# Patient Record
Sex: MALE | Race: WHITE | NOT HISPANIC OR LATINO | Employment: FULL TIME | URBAN - METROPOLITAN AREA
[De-identification: names, ages, dates, MRNs, and addresses within clinical notes are randomized per-mention and may not be internally consistent; named-entity substitution may affect disease eponyms.]

---

## 2017-10-02 ENCOUNTER — OFFICE VISIT (OUTPATIENT)
Dept: PRIMARY CARE CLINIC | Facility: CLINIC | Age: 22
End: 2017-10-02
Payer: COMMERCIAL

## 2017-10-02 ENCOUNTER — TELEPHONE (OUTPATIENT)
Dept: PRIMARY CARE CLINIC | Facility: CLINIC | Age: 22
End: 2017-10-02

## 2017-10-02 DIAGNOSIS — J06.9 VIRAL URI: ICD-10-CM

## 2017-10-02 DIAGNOSIS — H60.00 FURUNCLE OF EAR: Primary | ICD-10-CM

## 2017-10-02 PROCEDURE — 99999 PR PBB SHADOW E&M-EST. PATIENT-LVL II: CPT | Mod: PBBFAC,,, | Performed by: NURSE PRACTITIONER

## 2017-10-02 PROCEDURE — 99214 OFFICE O/P EST MOD 30 MIN: CPT | Mod: 25,S$GLB,, | Performed by: NURSE PRACTITIONER

## 2017-10-02 PROCEDURE — 10060 I&D ABSCESS SIMPLE/SINGLE: CPT | Mod: S$GLB,,, | Performed by: NURSE PRACTITIONER

## 2017-10-02 RX ORDER — SULFAMETHOXAZOLE AND TRIMETHOPRIM 800; 160 MG/1; MG/1
1 TABLET ORAL 2 TIMES DAILY
Qty: 14 TABLET | Refills: 0 | Status: SHIPPED | OUTPATIENT
Start: 2017-10-02 | End: 2017-10-09

## 2017-10-02 NOTE — TELEPHONE ENCOUNTER
----- Message from Ara Meza sent at 10/2/2017  9:20 AM CDT -----  Contact: Mother  Georgiana, mother [phone], Calling for same day appointment, bumps on back of throat and abscess behind right ear. Please advise. Thanks.

## 2017-10-02 NOTE — PROGRESS NOTES
"Chief Complaint  Chief Complaint   Patient presents with    Bump behind ear       HPI  Cesario Adrian is a 21 y.o. male with medical diagnoses as listed within the medical history and problem list that presents for sore throat, runny nose X 3 days.  Patient is new to me but known to the clinic per his last appointment. Presents with sore throat, runny nose for the past three days. Denies fever, cough, congestion. Also presents with "bump" behind right ear that appearing following popping of a blackhead. Popped approximately 3 days ago and has progressively gotten bigger. Tender to touch. Not currently taking any medications or treating with any ointments. Denies cp, sob, palpitations.         PAST MEDICAL HISTORY:  History reviewed. No pertinent past medical history.    PAST SURGICAL HISTORY:  History reviewed. No pertinent surgical history.    SOCIAL HISTORY:  Social History     Social History    Marital status: Single     Spouse name: N/A    Number of children: N/A    Years of education: N/A     Occupational History    Not on file.     Social History Main Topics    Smoking status: Never Smoker    Smokeless tobacco: Never Used    Alcohol use No    Drug use: No    Sexual activity: Yes     Other Topics Concern    Not on file     Social History Narrative    No narrative on file       FAMILY HISTORY:  History reviewed. No pertinent family history.    ALLERGIES AND MEDICATIONS: updated and reviewed.  Review of patient's allergies indicates:  No Known Allergies  Current Outpatient Prescriptions   Medication Sig Dispense Refill    sulfamethoxazole-trimethoprim 800-160mg (BACTRIM DS) 800-160 mg Tab Take 1 tablet by mouth 2 (two) times daily. 14 tablet 0     No current facility-administered medications for this visit.          ROS  Review of Systems   Constitutional: Negative for chills, fatigue and fever.   HENT: Negative for congestion, rhinorrhea, sinus pressure and sore throat.    Respiratory: Negative for " cough, chest tightness and shortness of breath.    Cardiovascular: Negative for chest pain and palpitations.   Gastrointestinal: Negative for abdominal pain, blood in stool, diarrhea, nausea and vomiting.   Genitourinary: Negative for dysuria, frequency, hematuria and urgency.   Musculoskeletal: Negative for arthralgias and joint swelling.   Skin: Negative for rash and wound.   Neurological: Negative for dizziness and headaches.   Psychiatric/Behavioral: Negative for dysphoric mood and sleep disturbance. The patient is not nervous/anxious.            PHYSICAL EXAM  There were no vitals filed for this visit. There is no height or weight on file to calculate BMI.              Physical Exam   Constitutional: He is oriented to person, place, and time. He appears well-developed and well-nourished.   HENT:   Head: Normocephalic.   Right Ear: Tympanic membrane normal.   Left Ear: Tympanic membrane normal.   Mouth/Throat: Uvula is midline and mucous membranes are normal. Posterior oropharyngeal erythema present. Tonsils are 1+ on the right. Tonsils are 1+ on the left. No tonsillar exudate.   Eyes: Conjunctivae are normal.   Cardiovascular: Normal rate, regular rhythm, normal heart sounds and normal pulses.    No murmur heard.  Pulses:       Radial pulses are 2+ on the right side, and 2+ on the left side.   Pulmonary/Chest: Effort normal and breath sounds normal. He has no wheezes.   Abdominal: Soft. Bowel sounds are normal. There is no tenderness.   Musculoskeletal: He exhibits no edema.   Lymphadenopathy:     He has no cervical adenopathy.   Neurological: He is alert and oriented to person, place, and time.   Skin: Skin is warm and dry. No rash noted.   Psychiatric: He has a normal mood and affect.         Procedure: Cesario was seen in the clinic room and placed in the lateral lying position on the treatment table. Attention was directed to the abscess which was located behind the right ear, where an wound measuring 1 cm cm  is noted. The area was prepped with betadine. Approximately 2cc of 2% lidocaine without epi was injected into the rodo-abscess area. Once the area was anesthetized allowing 2-3 minutes for the anesthetic to take effect, I utilized a #11 scalpel to cut through the skin into the abscess area. I allowed the pus to drain utilizing gauze to absorb drainage and blood. The abscess cavity was explored breaking up any loculations within the abscess cavity. The wound was then covered by placing a gauze dressing over the wound and securing with paper tape. The patient tolerated the procedure well.      Health Maintenance       Date Due Completion Date    Lipid Panel 1994 ---    TETANUS VACCINE 11/04/2012 ---    Influenza Vaccine 08/01/2017 ---            Assessment & Plan    Cesario was seen today for bump behind ear    Diagnoses and all orders for this visit:    Furuncle of ear  -     sulfamethoxazole-trimethoprim 800-160mg (BACTRIM DS) 800-160 mg Tab; Take 1 tablet by mouth 2 (two) times daily.  - Warm compresses 20 minutes twice daily.       Viral URI       -  Instructed to call if symptoms persist for 5 more day, for worsening of symptoms, and for the occurrence of fever.           Health Maintenance reviewed.    Follow-up: Return if symptoms worsen or fail to improve.

## 2018-06-25 ENCOUNTER — OFFICE VISIT (OUTPATIENT)
Dept: PRIMARY CARE CLINIC | Facility: CLINIC | Age: 23
End: 2018-06-25
Payer: COMMERCIAL

## 2018-06-25 VITALS
BODY MASS INDEX: 23.48 KG/M2 | HEIGHT: 70 IN | RESPIRATION RATE: 18 BRPM | TEMPERATURE: 99 F | SYSTOLIC BLOOD PRESSURE: 113 MMHG | HEART RATE: 73 BPM | DIASTOLIC BLOOD PRESSURE: 68 MMHG | OXYGEN SATURATION: 97 % | WEIGHT: 164 LBS

## 2018-06-25 DIAGNOSIS — J02.9 ACUTE PHARYNGITIS, UNSPECIFIED ETIOLOGY: Primary | ICD-10-CM

## 2018-06-25 PROCEDURE — 99213 OFFICE O/P EST LOW 20 MIN: CPT | Mod: S$GLB,,, | Performed by: FAMILY MEDICINE

## 2018-06-25 PROCEDURE — 99999 PR PBB SHADOW E&M-EST. PATIENT-LVL III: CPT | Mod: PBBFAC,,, | Performed by: FAMILY MEDICINE

## 2018-06-25 PROCEDURE — 3008F BODY MASS INDEX DOCD: CPT | Mod: CPTII,S$GLB,, | Performed by: FAMILY MEDICINE

## 2018-06-25 RX ORDER — AMOXICILLIN 500 MG/1
500 CAPSULE ORAL 3 TIMES DAILY
Qty: 30 CAPSULE | Refills: 0 | Status: SHIPPED | OUTPATIENT
Start: 2018-06-25 | End: 2018-07-05

## 2018-06-25 NOTE — PROGRESS NOTES
"Subjective:       Patient ID: Cesario Adrian is a 22 y.o. male.    Chief Complaint: Sore Throat (patient says his throat is sore and has "bumps" on his tongue )    Sore Throat    This is a new problem. The current episode started in the past 7 days. The problem has been gradually worsening. Neither side of throat is experiencing more pain than the other. There has been no fever. The pain is moderate. Associated symptoms include swollen glands. Pertinent negatives include no abdominal pain, coughing, diarrhea, headaches, hoarse voice, shortness of breath or vomiting. He has had no exposure to strep or mono. He has tried gargles for the symptoms. The treatment provided no relief.     Review of Systems   Constitutional: Negative for fever.   HENT: Positive for sore throat. Negative for hoarse voice.    Respiratory: Negative for cough and shortness of breath.    Gastrointestinal: Negative for abdominal pain, diarrhea and vomiting.   Skin: Negative for rash.   Neurological: Negative for headaches.       Objective:      Vitals:    06/25/18 1604   BP: 113/68   BP Location: Left arm   Patient Position: Sitting   BP Method: Medium (Automatic)   Pulse: 73   Resp: 18   Temp: 98.6 °F (37 °C)   TempSrc: Oral   SpO2: 97%   Weight: 74.4 kg (164 lb)   Height: 5' 10" (1.778 m)     Physical Exam   Constitutional: He is oriented to person, place, and time. He appears well-developed and well-nourished.   HENT:   Head: Normocephalic and atraumatic.   Mouth/Throat: Posterior oropharyngeal erythema present. No oropharyngeal exudate.   Cardiovascular: Normal rate, regular rhythm and normal heart sounds.    Pulmonary/Chest: Effort normal and breath sounds normal.   Musculoskeletal: He exhibits no edema.   Lymphadenopathy:     He has cervical adenopathy.   Neurological: He is alert and oriented to person, place, and time.   Skin: Skin is warm and dry.   Nursing note and vitals reviewed.      Assessment:       1. Acute pharyngitis, " unspecified etiology        Plan:       Acute pharyngitis, unspecified etiology  -     amoxicillin (AMOXIL) 500 MG capsule; Take 1 capsule (500 mg total) by mouth 3 (three) times daily. for 10 days  Dispense: 30 capsule; Refill: 0

## 2018-07-17 ENCOUNTER — OFFICE VISIT (OUTPATIENT)
Dept: PRIMARY CARE CLINIC | Facility: CLINIC | Age: 23
End: 2018-07-17
Payer: COMMERCIAL

## 2018-07-17 VITALS
HEART RATE: 48 BPM | BODY MASS INDEX: 20.12 KG/M2 | SYSTOLIC BLOOD PRESSURE: 102 MMHG | DIASTOLIC BLOOD PRESSURE: 61 MMHG | RESPIRATION RATE: 18 BRPM | OXYGEN SATURATION: 100 % | HEIGHT: 71 IN | WEIGHT: 143.69 LBS | TEMPERATURE: 98 F

## 2018-07-17 DIAGNOSIS — K14.0 GLOSSITIS: Primary | ICD-10-CM

## 2018-07-17 DIAGNOSIS — J02.9 PHARYNGITIS, UNSPECIFIED ETIOLOGY: ICD-10-CM

## 2018-07-17 PROCEDURE — 99213 OFFICE O/P EST LOW 20 MIN: CPT | Mod: 25,S$GLB,, | Performed by: FAMILY MEDICINE

## 2018-07-17 PROCEDURE — 99999 PR PBB SHADOW E&M-EST. PATIENT-LVL IV: CPT | Mod: PBBFAC,,, | Performed by: FAMILY MEDICINE

## 2018-07-17 PROCEDURE — 96372 THER/PROPH/DIAG INJ SC/IM: CPT | Mod: S$GLB,,, | Performed by: FAMILY MEDICINE

## 2018-07-17 PROCEDURE — 3008F BODY MASS INDEX DOCD: CPT | Mod: CPTII,S$GLB,, | Performed by: FAMILY MEDICINE

## 2018-07-17 RX ORDER — AZITHROMYCIN 250 MG/1
TABLET, FILM COATED ORAL
Qty: 6 TABLET | Refills: 0 | Status: SHIPPED | OUTPATIENT
Start: 2018-07-17 | End: 2018-07-21

## 2018-07-17 RX ORDER — BETAMETHASONE SODIUM PHOSPHATE AND BETAMETHASONE ACETATE 3; 3 MG/ML; MG/ML
12 INJECTION, SUSPENSION INTRA-ARTICULAR; INTRALESIONAL; INTRAMUSCULAR; SOFT TISSUE
Status: COMPLETED | OUTPATIENT
Start: 2018-07-17 | End: 2018-07-17

## 2018-07-17 RX ORDER — NYSTATIN 100000 [USP'U]/ML
5 SUSPENSION ORAL 4 TIMES DAILY
Qty: 200 ML | Refills: 0 | Status: SHIPPED | OUTPATIENT
Start: 2018-07-17 | End: 2018-07-27

## 2018-07-17 RX ORDER — METHYLPREDNISOLONE 4 MG/1
TABLET ORAL
Qty: 1 PACKAGE | Refills: 0 | Status: SHIPPED | OUTPATIENT
Start: 2018-07-17 | End: 2018-08-07

## 2018-07-17 RX ADMIN — BETAMETHASONE SODIUM PHOSPHATE AND BETAMETHASONE ACETATE 12 MG: 3; 3 INJECTION, SUSPENSION INTRA-ARTICULAR; INTRALESIONAL; INTRAMUSCULAR; SOFT TISSUE at 03:07

## 2018-07-17 NOTE — PROGRESS NOTES
Subjective:       Patient ID: Cesario Adrian is a 22 y.o. male.    Chief Complaint: Follow-up (still has bumps in back of his throat and now has them on his tongue)    HPI:  22-year-old white male in for followup sore throat seen by Dr. Worthy 06/25/2018 felt to have pharyngitis treated with amoxicillin.  Was hard to swallow went away for a little while but hard to swallow again.  No fever, no runny nose, +sore throat---patient has same bumps in his throat and on his tongue not sure of taste buds are not, slight cough--was treated with amoxicillin.  No pneumonia asthma TB.  Social smoker-cigar    ROS:  Skin: no psoriasis, eczema, skin cancer  HEENT: No headache, ocular pain, blurred vision, diplopia, epistaxis, hoarseness change in voice, thyroid trouble  Lung: No pneumonia, asthma, Tb, wheezing, SOB, +smokes occasional cigar  Heart: No chest pain, ankle edema, palpitations, MI, spohia murmur, hypertension, hyperlipidemia  Abdomen: No nausea, vomiting, diarrhea, constipation, ulcers, hepatitis, gallbladder disease, melena, hematochezia, hematemesis  : no UTI, renal disease, stones  MS: no fractures, O/A, lupus, rheumatoid, gout  Neuro: No dizziness, LOC, seizures   No diabetes, no anemia, no anxiety, no depression   Single, no children, 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drywalls, 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    Objective:   Physical Exam:  General: Well nourished, well developed, no acute distress +thin  Skin: No lesions  HEENT: Eyes PERRLA, EOM intact, nose patent, throat--swollen taste buds in the back of the time--erythematous with some erythema on the side of the left toe--had some discharge in the past from the left lower wisdom tooth--appears okay male but the taste buds in the left side of the tongue does show some areas of erythema and swelling, slight white coat to the back of the time  NECK: Supple, no bruits, No JVD, no nodes  Lungs: Clear, no rales, rhonchi, wheezing  Heart: Regular rate and rhythm, no murmurs, gallops, or  rubs  Abdomen: flat, bowel sounds positive, no tenderness, or organomegaly  MS: Range of motion and muscle strength intact  Neuro: Alert, CN intact, oriented X 3  Extremities: No cyanosis, clubbing, or edema         Assessment:       1. Glossitis    2. Pharyngitis, unspecified etiology        Plan:       Glossitis    Pharyngitis, unspecified etiology      Celeston/eZithromax/Medrol/nystatin oral suspension--if lung zone tongue irritation decide the time persist see ENT  Get appointment with dentist to evaluate left with some to lower jaw

## 2018-12-18 ENCOUNTER — OFFICE VISIT (OUTPATIENT)
Dept: PRIMARY CARE CLINIC | Facility: CLINIC | Age: 23
End: 2018-12-18
Payer: COMMERCIAL

## 2018-12-18 VITALS
HEART RATE: 50 BPM | DIASTOLIC BLOOD PRESSURE: 62 MMHG | SYSTOLIC BLOOD PRESSURE: 110 MMHG | WEIGHT: 157 LBS | HEIGHT: 71 IN | RESPIRATION RATE: 18 BRPM | BODY MASS INDEX: 21.98 KG/M2 | OXYGEN SATURATION: 99 %

## 2018-12-18 DIAGNOSIS — L02.91 CUTANEOUS ABSCESS, UNSPECIFIED SITE: Primary | ICD-10-CM

## 2018-12-18 PROCEDURE — 99999 PR PBB SHADOW E&M-EST. PATIENT-LVL III: CPT | Mod: PBBFAC,,, | Performed by: FAMILY MEDICINE

## 2018-12-18 PROCEDURE — 99213 OFFICE O/P EST LOW 20 MIN: CPT | Mod: 25,S$GLB,, | Performed by: FAMILY MEDICINE

## 2018-12-18 PROCEDURE — 69000 DRG XTRNL EAR ABSC/HEM SMPL: CPT | Mod: LT,S$GLB,, | Performed by: FAMILY MEDICINE

## 2018-12-18 PROCEDURE — 3008F BODY MASS INDEX DOCD: CPT | Mod: CPTII,S$GLB,, | Performed by: FAMILY MEDICINE

## 2018-12-18 RX ORDER — HYDROCODONE BITARTRATE AND ACETAMINOPHEN 5; 325 MG/1; MG/1
1 TABLET ORAL EVERY 6 HOURS PRN
Qty: 30 TABLET | Refills: 0 | Status: SHIPPED | OUTPATIENT
Start: 2018-12-18 | End: 2020-07-14 | Stop reason: CLARIF

## 2018-12-18 RX ORDER — CEPHALEXIN 500 MG/1
500 CAPSULE ORAL EVERY 8 HOURS
Qty: 30 CAPSULE | Refills: 0 | Status: SHIPPED | OUTPATIENT
Start: 2018-12-18 | End: 2020-07-14 | Stop reason: CLARIF

## 2018-12-18 NOTE — PROGRESS NOTES
Subjective:       Patient ID: Cesario Adrian is a 23 y.o. male.    Chief Complaint: Abscess    HPI:  24 yo WM--patient with abscess behind left ear-the did have one behind  the opposite ear in the past.  Was at least 1 year ago.  Patient feels like the lesion has gotten emptier  was more tense.    ROS:  Skin: no psoriasis, eczema, skin cancer see history of present  HEENT: No headache, ocular pain, blurred vision, diplopia, epistaxis, hoarseness change in voice, thyroid trouble  Lung: No pneumonia, asthma, Tb, wheezing, SOB, +smokes occasional cigar  Heart: No chest pain, ankle edema, palpitations, MI, sophia murmur, hypertension, hyperlipidemia  Abdomen: No nausea, vomiting, diarrhea, constipation, ulcers, hepatitis, gallbladder disease, melena, hematochezia, hematemesis  : no UTI, renal disease, stones  MS: no fractures, O/A, lupus, rheumatoid, gout  Neuro: No dizziness, LOC, seizures   No diabetes, no anemia, no anxiety, no depression   Single, no children, 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[employer], 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    Objective:   Physical Exam:  General: Well nourished, well developed, no acute distress +thin  Skin:  Cyst behind left auricle approximately 1 cm diameter raise--soft  HEENT: Eyes PERRLA, EOM intact, nose patent, throat-non erythematous ears TMs clear  NECK: Supple, no bruits, No JVD, no nodes  Lungs: Clear, no rales, rhonchi, wheezing  Heart: Regular rate and rhythm, no murmurs, gallops, or rubs  Abdomen: flat, bowel sounds positive, no tenderness, or organomegaly  MS: Range of motion and muscle strength intact  Neuro: Alert, CN intact, oriented X 3  Extremities: No cyanosis, clubbing, or edema         Assessment:       1. Cutaneous abscess, unspecified site        Plan:       Cutaneous abscess, unspecified site      lesion 10 was xylocaine cut with a 11.  Blade --pus obtained  Keflex 500 tid, neosporin BID, Norco 1/2 po q 6 hrs prn pain  If recurs to derm for removal

## 2018-12-18 NOTE — PROCEDURES
Incision & Drainage  Date/Time: 12/18/2018 12:12 PM  Performed by: Shukri Yanez MD  Authorized by: Shukri Yanez MD     Consent Done?:  Not Needed    Type:  Abscess  Body area:  Head/neck  Location details:  Left external ear  Anesthesia:  Local infiltration  Local anesthetic: lidocaine 1% without epinephrine  Scalpel size:  11  Incision type:  Single straight  Complexity:  Simple  Drainage:  Pus and serosanguinous  Drainage amount:  Moderate  Wound treatment:  Incision and wound left open  Patient tolerance:  Patient tolerated the procedure well with no immediate complications

## 2020-10-05 ENCOUNTER — PATIENT MESSAGE (OUTPATIENT)
Dept: ADMINISTRATIVE | Facility: HOSPITAL | Age: 25
End: 2020-10-05

## 2021-01-04 ENCOUNTER — PATIENT MESSAGE (OUTPATIENT)
Dept: ADMINISTRATIVE | Facility: HOSPITAL | Age: 26
End: 2021-01-04

## 2021-04-05 ENCOUNTER — PATIENT MESSAGE (OUTPATIENT)
Dept: ADMINISTRATIVE | Facility: HOSPITAL | Age: 26
End: 2021-04-05

## 2021-07-01 ENCOUNTER — PATIENT MESSAGE (OUTPATIENT)
Dept: ADMINISTRATIVE | Facility: OTHER | Age: 26
End: 2021-07-01

## 2022-07-17 PROBLEM — A41.89 SEPSIS, VIRAL: Status: ACTIVE | Noted: 2022-07-17

## 2022-07-17 PROBLEM — U07.1 COVID-19: Status: ACTIVE | Noted: 2022-07-17

## 2022-07-17 PROBLEM — N17.9 AKI (ACUTE KIDNEY INJURY): Status: ACTIVE | Noted: 2022-07-17

## 2022-07-17 PROBLEM — E86.9 VOLUME DEPLETION: Status: ACTIVE | Noted: 2022-07-17

## 2022-07-17 PROBLEM — B97.89 SEPSIS, VIRAL: Status: ACTIVE | Noted: 2022-07-17

## 2022-07-19 PROBLEM — R00.1 BRADYCARDIA: Status: ACTIVE | Noted: 2022-07-19

## 2022-07-20 PROBLEM — E86.9 VOLUME DEPLETION: Status: RESOLVED | Noted: 2022-07-17 | Resolved: 2022-07-20

## 2022-07-20 PROBLEM — N17.9 ARF (ACUTE RENAL FAILURE): Status: RESOLVED | Noted: 2022-07-17 | Resolved: 2022-07-20

## 2023-02-17 PROBLEM — D72.829 LEUKOCYTOSIS: Status: ACTIVE | Noted: 2023-02-17

## 2023-02-17 PROBLEM — E83.52 HYPERCALCEMIA: Status: ACTIVE | Noted: 2023-02-17

## 2023-02-17 PROBLEM — F12.188 CANNABIS HYPEREMESIS SYNDROME CONCURRENT WITH AND DUE TO CANNABIS ABUSE: Status: ACTIVE | Noted: 2023-02-17

## 2023-02-17 PROBLEM — E87.6 HYPOKALEMIA: Status: ACTIVE | Noted: 2023-02-17

## 2023-02-18 PROBLEM — E87.6 HYPOKALEMIA: Status: RESOLVED | Noted: 2023-02-17 | Resolved: 2023-02-18

## 2023-02-18 PROBLEM — N17.9 AKI (ACUTE KIDNEY INJURY): Status: RESOLVED | Noted: 2022-07-17 | Resolved: 2023-02-18

## 2023-02-18 PROBLEM — E83.52 HYPERCALCEMIA: Status: RESOLVED | Noted: 2023-02-17 | Resolved: 2023-02-18

## 2023-07-18 ENCOUNTER — OFFICE VISIT (OUTPATIENT)
Dept: PRIMARY CARE CLINIC | Facility: CLINIC | Age: 28
End: 2023-07-18
Payer: COMMERCIAL

## 2023-07-18 VITALS
DIASTOLIC BLOOD PRESSURE: 68 MMHG | RESPIRATION RATE: 18 BRPM | SYSTOLIC BLOOD PRESSURE: 110 MMHG | HEART RATE: 75 BPM | BODY MASS INDEX: 22.41 KG/M2 | OXYGEN SATURATION: 98 % | TEMPERATURE: 98 F | HEIGHT: 70 IN | WEIGHT: 156.5 LBS

## 2023-07-18 DIAGNOSIS — B35.3 TINEA PEDIS OF LEFT FOOT: ICD-10-CM

## 2023-07-18 DIAGNOSIS — A60.00 GENITAL HERPES SIMPLEX, UNSPECIFIED SITE: ICD-10-CM

## 2023-07-18 DIAGNOSIS — Z23 NEED FOR VACCINATION: ICD-10-CM

## 2023-07-18 DIAGNOSIS — E80.6 HYPERBILIRUBINEMIA: ICD-10-CM

## 2023-07-18 DIAGNOSIS — Z76.89 ENCOUNTER TO ESTABLISH CARE: Primary | ICD-10-CM

## 2023-07-18 DIAGNOSIS — Z13.6 ENCOUNTER FOR SCREENING FOR CARDIOVASCULAR DISORDERS: ICD-10-CM

## 2023-07-18 PROCEDURE — 90471 IMMUNIZATION ADMIN: CPT | Mod: S$GLB,,, | Performed by: STUDENT IN AN ORGANIZED HEALTH CARE EDUCATION/TRAINING PROGRAM

## 2023-07-18 PROCEDURE — 90715 TDAP VACCINE GREATER THAN OR EQUAL TO 7YO IM: ICD-10-PCS | Mod: S$GLB,,, | Performed by: STUDENT IN AN ORGANIZED HEALTH CARE EDUCATION/TRAINING PROGRAM

## 2023-07-18 PROCEDURE — 3008F PR BODY MASS INDEX (BMI) DOCUMENTED: ICD-10-PCS | Mod: CPTII,S$GLB,, | Performed by: STUDENT IN AN ORGANIZED HEALTH CARE EDUCATION/TRAINING PROGRAM

## 2023-07-18 PROCEDURE — 1159F PR MEDICATION LIST DOCUMENTED IN MEDICAL RECORD: ICD-10-PCS | Mod: CPTII,S$GLB,, | Performed by: STUDENT IN AN ORGANIZED HEALTH CARE EDUCATION/TRAINING PROGRAM

## 2023-07-18 PROCEDURE — 99204 OFFICE O/P NEW MOD 45 MIN: CPT | Mod: 25,S$GLB,, | Performed by: STUDENT IN AN ORGANIZED HEALTH CARE EDUCATION/TRAINING PROGRAM

## 2023-07-18 PROCEDURE — 99999 PR PBB SHADOW E&M-EST. PATIENT-LVL IV: ICD-10-PCS | Mod: PBBFAC,,, | Performed by: STUDENT IN AN ORGANIZED HEALTH CARE EDUCATION/TRAINING PROGRAM

## 2023-07-18 PROCEDURE — 90472 TDAP VACCINE GREATER THAN OR EQUAL TO 7YO IM: ICD-10-PCS | Mod: S$GLB,,, | Performed by: STUDENT IN AN ORGANIZED HEALTH CARE EDUCATION/TRAINING PROGRAM

## 2023-07-18 PROCEDURE — 3074F SYST BP LT 130 MM HG: CPT | Mod: CPTII,S$GLB,, | Performed by: STUDENT IN AN ORGANIZED HEALTH CARE EDUCATION/TRAINING PROGRAM

## 2023-07-18 PROCEDURE — 90651 9VHPV VACCINE 2/3 DOSE IM: CPT | Mod: S$GLB,,, | Performed by: STUDENT IN AN ORGANIZED HEALTH CARE EDUCATION/TRAINING PROGRAM

## 2023-07-18 PROCEDURE — 1160F RVW MEDS BY RX/DR IN RCRD: CPT | Mod: CPTII,S$GLB,, | Performed by: STUDENT IN AN ORGANIZED HEALTH CARE EDUCATION/TRAINING PROGRAM

## 2023-07-18 PROCEDURE — 90471 HPV VACCINE 9-VALENT 3 DOSE IM: ICD-10-PCS | Mod: S$GLB,,, | Performed by: STUDENT IN AN ORGANIZED HEALTH CARE EDUCATION/TRAINING PROGRAM

## 2023-07-18 PROCEDURE — 1160F PR REVIEW ALL MEDS BY PRESCRIBER/CLIN PHARMACIST DOCUMENTED: ICD-10-PCS | Mod: CPTII,S$GLB,, | Performed by: STUDENT IN AN ORGANIZED HEALTH CARE EDUCATION/TRAINING PROGRAM

## 2023-07-18 PROCEDURE — 99204 PR OFFICE/OUTPT VISIT, NEW, LEVL IV, 45-59 MIN: ICD-10-PCS | Mod: 25,S$GLB,, | Performed by: STUDENT IN AN ORGANIZED HEALTH CARE EDUCATION/TRAINING PROGRAM

## 2023-07-18 PROCEDURE — 90715 TDAP VACCINE 7 YRS/> IM: CPT | Mod: S$GLB,,, | Performed by: STUDENT IN AN ORGANIZED HEALTH CARE EDUCATION/TRAINING PROGRAM

## 2023-07-18 PROCEDURE — 3008F BODY MASS INDEX DOCD: CPT | Mod: CPTII,S$GLB,, | Performed by: STUDENT IN AN ORGANIZED HEALTH CARE EDUCATION/TRAINING PROGRAM

## 2023-07-18 PROCEDURE — 1159F MED LIST DOCD IN RCRD: CPT | Mod: CPTII,S$GLB,, | Performed by: STUDENT IN AN ORGANIZED HEALTH CARE EDUCATION/TRAINING PROGRAM

## 2023-07-18 PROCEDURE — 90651 HPV VACCINE 9-VALENT 3 DOSE IM: ICD-10-PCS | Mod: S$GLB,,, | Performed by: STUDENT IN AN ORGANIZED HEALTH CARE EDUCATION/TRAINING PROGRAM

## 2023-07-18 PROCEDURE — 3078F DIAST BP <80 MM HG: CPT | Mod: CPTII,S$GLB,, | Performed by: STUDENT IN AN ORGANIZED HEALTH CARE EDUCATION/TRAINING PROGRAM

## 2023-07-18 PROCEDURE — 99999 PR PBB SHADOW E&M-EST. PATIENT-LVL IV: CPT | Mod: PBBFAC,,, | Performed by: STUDENT IN AN ORGANIZED HEALTH CARE EDUCATION/TRAINING PROGRAM

## 2023-07-18 PROCEDURE — 3078F PR MOST RECENT DIASTOLIC BLOOD PRESSURE < 80 MM HG: ICD-10-PCS | Mod: CPTII,S$GLB,, | Performed by: STUDENT IN AN ORGANIZED HEALTH CARE EDUCATION/TRAINING PROGRAM

## 2023-07-18 PROCEDURE — 90472 IMMUNIZATION ADMIN EACH ADD: CPT | Mod: S$GLB,,, | Performed by: STUDENT IN AN ORGANIZED HEALTH CARE EDUCATION/TRAINING PROGRAM

## 2023-07-18 PROCEDURE — 3074F PR MOST RECENT SYSTOLIC BLOOD PRESSURE < 130 MM HG: ICD-10-PCS | Mod: CPTII,S$GLB,, | Performed by: STUDENT IN AN ORGANIZED HEALTH CARE EDUCATION/TRAINING PROGRAM

## 2023-07-18 RX ORDER — CLOTRIMAZOLE 1 %
CREAM (GRAM) TOPICAL 2 TIMES DAILY
Qty: 60 G | Refills: 1 | Status: SHIPPED | OUTPATIENT
Start: 2023-07-18

## 2023-07-18 NOTE — PROGRESS NOTES
"Subjective:       Patient ID: Cesario Adrian is a 27 y.o. male.    Chief Complaint: Establish Care    HPI:  27 y.o. male presents to [facility] to establish care    Acute concerns?: Patient reports that he was recently diagnosed with HSV and was wanting information on how to proceed. Patient reports lesions currently scabbing over and on Valtrex. This is first outbreak. Has had unprotected sex in months prior.    Reports that he frequently needs to wear work boots and has lesion to left lateral toe that when wet can burn and sloughing with fissures.    Last PCP?: Last PCP was Dr. Shukri Yanez  Allergies: NKDA  Medical History: HSV  Medications: Valacyclovir 1000 mg bid, lidocaine 2%  Surgical History: None  Family History: Grandfather had cancer; no known autoimmune disease  Social History: Occasional cigarette, No EtOH, marijuana, no other illicit     Fasting?: Yes  Last tetanus vaccine?: Amenable    Review of Systems   Constitutional:  Negative for chills, diaphoresis, fatigue and fever.   HENT:  Negative for congestion, sinus pressure, sneezing and sore throat.    Respiratory:  Negative for cough, chest tightness and shortness of breath.    Cardiovascular:  Negative for chest pain and palpitations.   Gastrointestinal:  Negative for abdominal pain, diarrhea, nausea and vomiting.   Skin:  Negative for rash and wound.        Left foot rash   Neurological:  Negative for dizziness, light-headedness and headaches.     Objective:      Vitals:    07/18/23 0901   BP: 110/68   BP Location: Left arm   Patient Position: Sitting   BP Method: Medium (Manual)   Pulse: 75   Resp: 18   Temp: 97.7 °F (36.5 °C)   TempSrc: Temporal   SpO2: 98%   Weight: 71 kg (156 lb 8.4 oz)   Height: 5' 10" (1.778 m)     Physical Exam  Vitals reviewed.   Constitutional:       General: He is not in acute distress.     Appearance: Normal appearance. He is not ill-appearing.   HENT:      Head: Normocephalic and atraumatic.   Eyes:      General:    "      Right eye: No discharge.         Left eye: No discharge.      Conjunctiva/sclera: Conjunctivae normal.   Cardiovascular:      Rate and Rhythm: Normal rate and regular rhythm.      Pulses: Normal pulses.      Heart sounds: No murmur heard.  Pulmonary:      Effort: Pulmonary effort is normal.      Breath sounds: Normal breath sounds.   Musculoskeletal:         General: No deformity.      Cervical back: Neck supple. No rigidity.   Lymphadenopathy:      Cervical: No cervical adenopathy.   Skin:     General: Skin is warm and dry.      Coloration: Skin is not jaundiced.      Comments: Mild sloughing with dermal layer of skin removed between 4th and 5th digits left foot   Neurological:      General: No focal deficit present.      Mental Status: He is alert and oriented to person, place, and time.   Psychiatric:         Mood and Affect: Mood normal.         Behavior: Behavior normal.           Lab Results   Component Value Date     02/18/2023    K 4.7 02/18/2023     (H) 02/18/2023    CO2 20 (L) 02/18/2023    BUN 19 02/18/2023    CREATININE 1.2 02/18/2023    ANIONGAP 7 (L) 02/18/2023     No results found for: HGBA1C  Lab Results   Component Value Date    BNP <10 07/17/2022       Lab Results   Component Value Date    WBC 23.45 (H) 02/18/2023    HGB 13.8 (L) 02/18/2023    HCT 40.9 02/18/2023    HCT 61 (H) 02/17/2023     02/18/2023    GRAN 19.3 (H) 02/18/2023    GRAN 82.1 (H) 02/18/2023     No results found for: CHOL, HDL, LDLCALC, TRIG       Current Outpatient Medications:     valACYclovir (VALTREX) 1000 MG tablet, Take 1 tablet (1,000 mg total) by mouth 2 (two) times daily. for 7 days, Disp: 14 tablet, Rfl: 0    clotrimazole (LOTRIMIN) 1 % cream, Apply topically 2 (two) times daily., Disp: 60 g, Rfl: 1    LIDOcaine HCl 2% (XYLOCAINE) 2 % JelP urojet, by Mucous Membrane route 2 (two) times daily as needed. (Patient not taking: Reported on 7/18/2023), Disp: 20 mL, Rfl: 0        Assessment:       1.  Encounter to establish care    2. Genital herpes simplex, unspecified site    3. Hyperbilirubinemia    4. Encounter for screening for cardiovascular disorders    5. Need for vaccination    6. Tinea pedis of left foot           Plan:       Encounter to establish care    Genital herpes simplex, unspecified site  -     Informed patient that infected persons typically express once with possible recurrence with stressor. If recurs once, will treat again as acute. If multiple recurrences can consider suppressive therapy. If known discordant partner, recommend suppressive therapy. General measure pamphlet given to patient today    Hyperbilirubinemia  -     COMPREHENSIVE METABOLIC PANEL; Future; Expected date: 07/18/2023  -     BILIRUBIN, DIRECT; Future; Expected date: 07/18/2023    Encounter for screening for cardiovascular disorders  -     Lipid Panel; Future; Expected date: 07/18/2023    Need for vaccination  -     (In Office Administered) HPV Vaccine (9-Valent) (3 Dose) (IM)  -     (In Office Administered) Tdap Vaccine    Tinea pedis of left foot  -     clotrimazole (LOTRIMIN) 1 % cream; Apply topically 2 (two) times daily.  Dispense: 60 g; Refill: 1  - Recommend OTC aerosol antifungal spray to work boots    RTC in 1 year, 2 mo and 6 mo nurse visit

## 2023-07-18 NOTE — PROGRESS NOTES
Identified pt. By name and   Administered hpv vaccine to left deltoid using aseptic technique  Administered tdap vaccine to tight deltoid using aseptic technique

## 2023-09-18 ENCOUNTER — PATIENT MESSAGE (OUTPATIENT)
Dept: PRIMARY CARE CLINIC | Facility: CLINIC | Age: 28
End: 2023-09-18

## 2023-09-18 ENCOUNTER — CLINICAL SUPPORT (OUTPATIENT)
Dept: PRIMARY CARE CLINIC | Facility: CLINIC | Age: 28
End: 2023-09-18
Payer: COMMERCIAL

## 2023-09-18 DIAGNOSIS — Z23 NEED FOR VACCINATION: Primary | ICD-10-CM

## 2023-09-18 PROCEDURE — 90471 HPV VACCINE 9-VALENT 3 DOSE IM: ICD-10-PCS | Mod: S$GLB,,, | Performed by: STUDENT IN AN ORGANIZED HEALTH CARE EDUCATION/TRAINING PROGRAM

## 2023-09-18 PROCEDURE — 90651 HPV VACCINE 9-VALENT 3 DOSE IM: ICD-10-PCS | Mod: S$GLB,,, | Performed by: STUDENT IN AN ORGANIZED HEALTH CARE EDUCATION/TRAINING PROGRAM

## 2023-09-18 PROCEDURE — 90471 IMMUNIZATION ADMIN: CPT | Mod: S$GLB,,, | Performed by: STUDENT IN AN ORGANIZED HEALTH CARE EDUCATION/TRAINING PROGRAM

## 2023-09-18 PROCEDURE — 90651 9VHPV VACCINE 2/3 DOSE IM: CPT | Mod: S$GLB,,, | Performed by: STUDENT IN AN ORGANIZED HEALTH CARE EDUCATION/TRAINING PROGRAM

## 2023-09-18 NOTE — PROGRESS NOTES
Verified pt ID using name and . ndka. Administered HPV in left deltoid per physician order using aseptic technique. Pt tolerated well with no adverse reactions noted.

## 2023-10-18 ENCOUNTER — PATIENT MESSAGE (OUTPATIENT)
Dept: CARDIOLOGY | Facility: CLINIC | Age: 28
End: 2023-10-18
Payer: COMMERCIAL

## 2024-09-19 ENCOUNTER — PATIENT MESSAGE (OUTPATIENT)
Dept: PRIMARY CARE CLINIC | Facility: CLINIC | Age: 29
End: 2024-09-19
Payer: COMMERCIAL

## 2024-11-11 NOTE — PROGRESS NOTES
"Subjective:      Cesario Adrian is a 29 y.o. male who presents for evaluation of nephrolithiasis.      He presented to the emergency department on 11/4 with complaint of nausea and vomiting.  CT renal stone study revealed 2mm left LP nephrolithiasis without hydronephrosis.  Denies previous history of renal stones   Denies previous  surgeries   Denies dysuria, flank pain, urgency, frequency  Drinks mostly tea and water; does report daily nausea and intermittent dehydration due to working outside    The following portions of the patient's history were reviewed and updated as appropriate: allergies, current medications, past family history, past medical history, past social history, past surgical history and problem list.    Review of Systems  Constitutional: no fever or chills  ENT: no nasal congestion or sore throat  Respiratory: no cough or shortness of breath  Cardiovascular: no chest pain or palpitations  Gastrointestinal: no nausea or vomiting, tolerating diet  Genitourinary: as per HPI  Hematologic/Lymphatic: no easy bruising or lymphadenopathy  Musculoskeletal: no arthralgias or myalgias  Neurological: no seizures or tremors  Behavioral/Psych: no auditory or visual hallucinations     Objective:   Vitals: BP 97/63 (BP Location: Right arm, Patient Position: Sitting)   Pulse 64   Ht 5' 10" (1.778 m)   Wt 68.9 kg (151 lb 14.4 oz)   BMI 21.79 kg/m²     Physical Exam   General: alert and oriented, no acute distress  Head: normocephalic, atraumatic  Neck: supple, no lymphadenopathy, normal ROM, no masses  Respiratory: Symmetric expansion, non-labored breathing  Cardiovascular: regular rate and rhythm, nomal pulses, no peripheral edema  Skin: normal coloration and turgor, no rashes, no suspicious skin lesions noted  Neuro: alert and oriented x3, no gross deficits  Psych: normal judgment and insight, normal mood/affect, and non-anxious    Physical Exam    Lab Review   Urinalysis demonstrates no ua   Lab Results "   Component Value Date    WBC 12.42 11/04/2024    HGB 15.9 11/04/2024    HCT 46.5 11/04/2024    HCT 61 (H) 02/17/2023    MCV 89 11/04/2024     11/04/2024     Lab Results   Component Value Date    CREATININE 1.0 11/04/2024    BUN 23 (H) 11/04/2024     Imaging  CT ABDOMEN PELVIS WITH IV CONTRAST     CLINICAL HISTORY:  Nausea/vomiting;     TECHNIQUE:  Postcontrast images were obtained through the abdomen and pelvis per protocol.  Coronal and sagittal images were reviewed.  75 cc intravenous contrast was administered.  Abdomen pelvis protocol.  Coronal and sagittal were reviewed.     COMPARISON:  06/02/2024     FINDINGS:  Limited images through the lower chest are unremarkable.     Abdomen and pelvis:     The liver, gallbladder, spleen, pancreas, adrenal glands, and right kidney appear unremarkable.  There is 0.2 cm nonobstructing left renal stone (series 3, image 69) and subcentimeter left renal cortical hypodensity, too small to characterize.  Urinary bladder is unremarkable.  Prostate unchanged.  Intrapelvic phleboliths.     The GI tract and appendix are normal in caliber.  No significant free fluid or pathologic adenopathy.  The main portal vein, splenic vein, and SMV are patent.     No aggressive osseous lesion.     Impression:     No acute abdominopelvic abnormality.     Punctate nonobstructing left renal stone and additional findings as above.        Electronically signed by:Brennan Montiel  Date:                                            11/04/2024  Time:                                           10:05    Assessment and Plan:   1. Nephrolithiasis  --CT imaging reviewed with patient consistent with nonobstructive kidney stone.  --We discussed how to prevent future stones:   --Eat fewer high-oxalate foods (spinach, bran flakes, rhubarb, beets, potato chips, french fries, nuts and nut butters)   --Eat calcium rich foods   --Limit the vitamin C content of your diet. Do not take more than 500 mg of Vitamin C  daily.   --It is very important to drink plenty of liquids. Your goal should be 10-12 glasses a day. At least 5-6 glasses should be water.    --Reduce sodium intake: 2-3 grams per day. Limit eating processed foods such as hot dogs, deli meats, sausage, canned products, dry soup mixes, sauerkraut, pickles, and various convenience mixes.   --Add citrus to diet i.e. lemonade, limes, orange juice.      --follow-up with PCP for nausea    This note is dictated on M*Modal word recognition program.  There are word recognition mistakes that are occasionally missed on review.

## 2024-11-12 ENCOUNTER — OFFICE VISIT (OUTPATIENT)
Dept: UROLOGY | Facility: CLINIC | Age: 29
End: 2024-11-12
Payer: COMMERCIAL

## 2024-11-12 VITALS
BODY MASS INDEX: 21.74 KG/M2 | WEIGHT: 151.88 LBS | HEIGHT: 70 IN | HEART RATE: 64 BPM | SYSTOLIC BLOOD PRESSURE: 97 MMHG | DIASTOLIC BLOOD PRESSURE: 63 MMHG

## 2024-11-12 DIAGNOSIS — N20.0 NEPHROLITHIASIS: ICD-10-CM

## 2024-11-12 PROCEDURE — 99999 PR PBB SHADOW E&M-EST. PATIENT-LVL IV: CPT | Mod: PBBFAC,,, | Performed by: NURSE PRACTITIONER

## 2024-11-12 PROCEDURE — 1159F MED LIST DOCD IN RCRD: CPT | Mod: CPTII,S$GLB,, | Performed by: NURSE PRACTITIONER

## 2024-11-12 PROCEDURE — 3008F BODY MASS INDEX DOCD: CPT | Mod: CPTII,S$GLB,, | Performed by: NURSE PRACTITIONER

## 2024-11-12 PROCEDURE — 3078F DIAST BP <80 MM HG: CPT | Mod: CPTII,S$GLB,, | Performed by: NURSE PRACTITIONER

## 2024-11-12 PROCEDURE — 99203 OFFICE O/P NEW LOW 30 MIN: CPT | Mod: S$GLB,,, | Performed by: NURSE PRACTITIONER

## 2024-11-12 PROCEDURE — 3074F SYST BP LT 130 MM HG: CPT | Mod: CPTII,S$GLB,, | Performed by: NURSE PRACTITIONER

## 2024-11-12 PROCEDURE — 1160F RVW MEDS BY RX/DR IN RCRD: CPT | Mod: CPTII,S$GLB,, | Performed by: NURSE PRACTITIONER

## 2025-03-10 ENCOUNTER — HOSPITAL ENCOUNTER (EMERGENCY)
Facility: HOSPITAL | Age: 30
Discharge: HOME | End: 2025-03-10
Attending: STUDENT IN AN ORGANIZED HEALTH CARE EDUCATION/TRAINING PROGRAM
Payer: COMMERCIAL

## 2025-03-10 VITALS
SYSTOLIC BLOOD PRESSURE: 114 MMHG | OXYGEN SATURATION: 98 % | RESPIRATION RATE: 16 BRPM | BODY MASS INDEX: 22.19 KG/M2 | HEIGHT: 70 IN | WEIGHT: 155 LBS | HEART RATE: 69 BPM | TEMPERATURE: 98 F | DIASTOLIC BLOOD PRESSURE: 61 MMHG

## 2025-03-10 DIAGNOSIS — K02.9 PAIN DUE TO DENTAL CARIES: ICD-10-CM

## 2025-03-10 DIAGNOSIS — K04.7 APICAL ABSCESS: Primary | ICD-10-CM

## 2025-03-10 DIAGNOSIS — K04.7 DENTAL INFECTION: ICD-10-CM

## 2025-03-10 PROCEDURE — 99283 EMERGENCY DEPT VISIT LOW MDM: CPT | Mod: 25

## 2025-03-10 PROCEDURE — 41800 DRAINAGE OF GUM LESION: CPT

## 2025-03-10 RX ORDER — AMOXICILLIN AND CLAVULANATE POTASSIUM 875; 125 MG/1; MG/1
1 TABLET, FILM COATED ORAL 2 TIMES DAILY
Qty: 14 TABLET | Refills: 0 | Status: SHIPPED | OUTPATIENT
Start: 2025-03-10

## 2025-03-10 RX ORDER — IBUPROFEN 600 MG/1
600 TABLET ORAL 3 TIMES DAILY PRN
Qty: 21 TABLET | Refills: 0 | Status: SHIPPED | OUTPATIENT
Start: 2025-03-10 | End: 2025-03-17

## 2025-03-10 NOTE — ED NOTES
Patient identifiers verified and correct for Cesario Adrian  LOC: The patient is aao x4  APPEARANCE: Patient appears comfortable and in no acute distress. +pain to L upper gums  SKIN: The skin is warm and dry, color consistent with ethnicity, patient has normal skin turgor and moist mucus membranes, skin intact  MUSCULOSKELETAL: Patient moving all extremities spontaneously, no swelling noted.  RESPIRATORY: Airway is open and patent, respirations are spontaneous, patient has a normal effort and rate, no accessory muscle use noted,O2 sats noted at 98% on room air.  CARDIAC: Patient has a normal rate, no edema noted, capillary refill < 3 seconds.   GASTRO: Soft and non tender to palpation, no distention noted  : Pt denies any pain or frequency with urination.  NEURO: Pt opens eyes spontaneously, behavior appropriate to situation, follows commands, facial expression symmetrical, bilateral hand grasp equal and even, purposeful motor response noted, normal sensation in all extremities when touched with a finger.

## 2025-03-10 NOTE — DISCHARGE INSTRUCTIONS
Take the prescribed Augmentin to treat an underlying dental infection. Take the antibiotic to completion to ensure your infection is totally treated.     Take over-the-counter Tylenol and Ibuprofen for pain. You may also use over-the-counter orajel additionally for pain.     Use the attached contact information to establish care with dentistry.     Return to the emergency room for fever, oral swelling, facial swelling, or other concerning symptoms.

## 2025-03-10 NOTE — ED TRIAGE NOTES
Pt arriving to ED c/o Abscess to L upper tooth noticed this morning. Has a cracked tooth to L back side for awhile, unsure if it is related

## 2025-05-22 NOTE — ED PROVIDER NOTES
Encounter Date: 3/10/2025       History     Chief Complaint   Patient presents with    Dental Pain     Abscess to r upper tooth     The history is provided by the patient and medical records. No  was used.     Cesario Adrian is a 29 y.o. male with medical history of anxiety presenting to the ED with the chief complaint of dental pain.     Reports a few days of L lower dental pain after cracking a tooth while eating food. Additionally he noticed swelling to his L upper frontal gum yesterday and concerned he has an abscess. He saw a dentist a few months ago and was told he had several cavities and needed work done that he was not able to afford at the time. Denies fever, facial swelling, difficulty swallowing, neck stiffness. No recent ABx.     Review of patient's allergies indicates:  No Known Allergies  Past Medical History:   Diagnosis Date    Anxiety      History reviewed. No pertinent surgical history.  Family History   Family history unknown: Yes     Social History[1]  Review of Systems   Constitutional:  Negative for fever.   HENT:  Positive for dental problem.        Physical Exam     Initial Vitals [03/10/25 0732]   BP Pulse Resp Temp SpO2   114/61 69 16 98.3 °F (36.8 °C) 98 %      MAP       --         Physical Exam    Constitutional: He appears well-developed and well-nourished. He is not diaphoretic. No distress.   HENT:   Head: Normocephalic and atraumatic. Mouth/Throat: Oropharynx is clear and moist.   Apical abscess between teeth 9 and 10  There is a cracked L posterior molar. No associated gingival swelling. No submandibular swelling or tenderness. Tolerating secretions. No trismus.   Eyes: EOM are normal. Pupils are equal, round, and reactive to light.   Neck:   Normal range of motion.  Cardiovascular:  Normal rate and regular rhythm.           Pulmonary/Chest: No respiratory distress.   Musculoskeletal:         General: Normal range of motion.      Cervical back: Normal range of  motion.     Neurological: He is alert and oriented to person, place, and time.   Skin: Skin is warm and dry. No rash noted.         ED Course   I & D - Incision and Drainage    Date/Time: 3/10/2025 8:09 AM  Location procedure was performed: [facility] EMERGENCY DEPARTMENT    Performed by: Mu Davis PA-C  Authorized by: Alon Stallings MD  Type: abscess  Body area: mouth  Location details: alveolar process    Anesthesia:  Local Anesthetic: topical anesthetic  Scalpel size: 11  Incision type: single straight  Incision depth: submucosal  Complexity: simple  Drainage: pus  Drainage amount: moderate  Wound treatment: wound left open and drainage  Packing material: none  Patient tolerance: Patient tolerated the procedure well with no immediate complications    Incision depth: submucosal        Labs Reviewed - No data to display       Imaging Results    None          Medications - No data to display  Medical Decision Making  29 y.o. male with medical history of anxiety presenting to the ED c/o dental problem for a few days.     DDx: Clinical presentation most consistent with apical abscess between teeth 9 and 10 that was I&D as described in procedure note above. He has a cracked posterior molar on his L lower side. No associated gingival edema. I have considered but do not suspect Ludwigs angina or deep neck space infection. Okay for outpatient follow-up with dentistry. RX for Augmentin provided. Patient expresses understanding and agreeable to the plan. Return to ED precautions given for new, worsening, or concerning symptoms.    Risk  Prescription drug management.                                      Clinical Impression:  Final diagnoses:  [K04.7] Apical abscess (Primary)  [K02.9] Pain due to dental caries          ED Disposition Condition    Discharge Stable          ED Prescriptions       Medication Sig Dispense Start Date End Date Auth. Provider    amoxicillin-clavulanate 875-125mg (AUGMENTIN) 875-125 mg per  tablet Take 1 tablet by mouth 2 (two) times daily. 14 tablet 3/10/2025 -- Mu Davis PA-C    ibuprofen (ADVIL,MOTRIN) 600 MG tablet Take 1 tablet (600 mg total) by mouth 3 (three) times daily as needed for Pain. 21 tablet 3/10/2025 3/17/2025 Mu Davis PA-C          Follow-up Information       Follow up With Specialties Details Why Contact [facility] Surgical Oncology, Orthopedic Surgery, Genetics, Physical Medicine and Rehabilitation, Occupational Therapy, Radiology   [address] [place] [id number]  [phone]      [facility]  Call   [address] [facility] [phone]                 [1]   Social History  Tobacco Use    Smoking status: Every Day     Types: Cigars    Smokeless tobacco: Never   Substance Use Topics    Alcohol use: Yes     Comment: RARE; 2 20 ozs.    Drug use: Yes     Types: Marijuana        Mu Davis PA-C  03/10/25 1010     home